# Patient Record
Sex: FEMALE | Race: BLACK OR AFRICAN AMERICAN | Employment: UNEMPLOYED | URBAN - METROPOLITAN AREA
[De-identification: names, ages, dates, MRNs, and addresses within clinical notes are randomized per-mention and may not be internally consistent; named-entity substitution may affect disease eponyms.]

---

## 2024-02-06 PROBLEM — N18.6 END-STAGE RENAL DISEASE (HCC): Chronic | Status: ACTIVE | Noted: 2021-07-23

## 2024-02-06 PROBLEM — E87.5 HYPERKALEMIA: Status: ACTIVE | Noted: 2018-05-29

## 2024-02-06 PROBLEM — E66.01 MORBID OBESITY (HCC): Status: ACTIVE | Noted: 2022-04-28

## 2024-02-06 PROBLEM — M10.9 GOUT: Status: ACTIVE | Noted: 2022-04-29

## 2024-02-06 PROBLEM — M10.9 GOUTY ARTHRITIS: Status: ACTIVE | Noted: 2018-06-13

## 2024-02-06 PROBLEM — D50.9 IRON DEFICIENCY ANEMIA: Status: ACTIVE | Noted: 2021-06-14

## 2024-02-06 PROBLEM — E55.9 VITAMIN D DEFICIENCY: Status: ACTIVE | Noted: 2019-03-30

## 2024-02-06 PROBLEM — E78.2 MIXED HYPERLIPIDEMIA: Status: ACTIVE | Noted: 2019-10-19

## 2024-02-26 RX ORDER — NICOTINE POLACRILEX 4 MG
30 LOZENGE BUCCAL
Status: CANCELLED | OUTPATIENT
Start: 2024-02-26

## 2024-02-26 RX ORDER — SODIUM CHLORIDE, SODIUM LACTATE, POTASSIUM CHLORIDE, CALCIUM CHLORIDE 600; 310; 30; 20 MG/100ML; MG/100ML; MG/100ML; MG/100ML
INJECTION, SOLUTION INTRAVENOUS CONTINUOUS
Status: CANCELLED | OUTPATIENT
Start: 2024-02-26

## 2024-02-26 RX ORDER — DEXTROSE MONOHYDRATE 25 G/50ML
50 INJECTION, SOLUTION INTRAVENOUS
Status: CANCELLED | OUTPATIENT
Start: 2024-02-26

## 2024-02-26 RX ORDER — NICOTINE POLACRILEX 4 MG
15 LOZENGE BUCCAL
Status: CANCELLED | OUTPATIENT
Start: 2024-02-26

## 2024-04-18 ENCOUNTER — ANESTHESIA (OUTPATIENT)
Dept: ENDOSCOPY | Facility: HOSPITAL | Age: 64
End: 2024-04-18
Payer: MEDICARE

## 2024-04-18 ENCOUNTER — ANESTHESIA EVENT (OUTPATIENT)
Dept: ENDOSCOPY | Facility: HOSPITAL | Age: 64
End: 2024-04-18
Payer: MEDICARE

## 2024-04-18 ENCOUNTER — HOSPITAL ENCOUNTER (OUTPATIENT)
Facility: HOSPITAL | Age: 64
Setting detail: HOSPITAL OUTPATIENT SURGERY
Discharge: HOME | End: 2024-04-18
Attending: INTERNAL MEDICINE | Admitting: INTERNAL MEDICINE
Payer: MEDICARE

## 2024-04-18 VITALS
HEIGHT: 64.5 IN | DIASTOLIC BLOOD PRESSURE: 73 MMHG | HEART RATE: 71 BPM | SYSTOLIC BLOOD PRESSURE: 146 MMHG | BODY MASS INDEX: 39.12 KG/M2 | TEMPERATURE: 98 F | RESPIRATION RATE: 16 BRPM | WEIGHT: 232 LBS | OXYGEN SATURATION: 98 %

## 2024-04-18 DIAGNOSIS — Z12.11 SCREENING FOR COLON CANCER: ICD-10-CM

## 2024-04-18 DIAGNOSIS — D50.8 OTHER IRON DEFICIENCY ANEMIAS: ICD-10-CM

## 2024-04-18 LAB
GLUCOSE BLD-MCNC: 132 MG/DL (ref 70–99)
GLUCOSE BLD-MCNC: 52 MG/DL (ref 70–99)

## 2024-04-18 PROCEDURE — 82962 GLUCOSE BLOOD TEST: CPT

## 2024-04-18 PROCEDURE — 88305 TISSUE EXAM BY PATHOLOGIST: CPT | Performed by: INTERNAL MEDICINE

## 2024-04-18 PROCEDURE — 0DB78ZX EXCISION OF STOMACH, PYLORUS, VIA NATURAL OR ARTIFICIAL OPENING ENDOSCOPIC, DIAGNOSTIC: ICD-10-PCS | Performed by: INTERNAL MEDICINE

## 2024-04-18 PROCEDURE — 0DBK8ZX EXCISION OF ASCENDING COLON, VIA NATURAL OR ARTIFICIAL OPENING ENDOSCOPIC, DIAGNOSTIC: ICD-10-PCS | Performed by: INTERNAL MEDICINE

## 2024-04-18 PROCEDURE — 0DBM8ZX EXCISION OF DESCENDING COLON, VIA NATURAL OR ARTIFICIAL OPENING ENDOSCOPIC, DIAGNOSTIC: ICD-10-PCS | Performed by: INTERNAL MEDICINE

## 2024-04-18 PROCEDURE — 0DBL8ZX EXCISION OF TRANSVERSE COLON, VIA NATURAL OR ARTIFICIAL OPENING ENDOSCOPIC, DIAGNOSTIC: ICD-10-PCS | Performed by: INTERNAL MEDICINE

## 2024-04-18 RX ORDER — SODIUM CHLORIDE, SODIUM LACTATE, POTASSIUM CHLORIDE, CALCIUM CHLORIDE 600; 310; 30; 20 MG/100ML; MG/100ML; MG/100ML; MG/100ML
INJECTION, SOLUTION INTRAVENOUS CONTINUOUS PRN
Status: DISCONTINUED | OUTPATIENT
Start: 2024-04-18 | End: 2024-04-18 | Stop reason: SURG

## 2024-04-18 RX ORDER — NALOXONE HYDROCHLORIDE 0.4 MG/ML
0.08 INJECTION, SOLUTION INTRAMUSCULAR; INTRAVENOUS; SUBCUTANEOUS ONCE AS NEEDED
Status: DISCONTINUED | OUTPATIENT
Start: 2024-04-18 | End: 2024-04-18

## 2024-04-18 RX ORDER — DEXTROSE MONOHYDRATE 25 G/50ML
50 INJECTION, SOLUTION INTRAVENOUS
Status: DISCONTINUED | OUTPATIENT
Start: 2024-04-18 | End: 2024-04-18

## 2024-04-18 RX ORDER — LIDOCAINE HYDROCHLORIDE 10 MG/ML
INJECTION, SOLUTION EPIDURAL; INFILTRATION; INTRACAUDAL; PERINEURAL AS NEEDED
Status: DISCONTINUED | OUTPATIENT
Start: 2024-04-18 | End: 2024-04-18 | Stop reason: SURG

## 2024-04-18 RX ORDER — NICOTINE POLACRILEX 4 MG
15 LOZENGE BUCCAL
Status: DISCONTINUED | OUTPATIENT
Start: 2024-04-18 | End: 2024-04-18

## 2024-04-18 RX ORDER — SODIUM CHLORIDE, SODIUM LACTATE, POTASSIUM CHLORIDE, CALCIUM CHLORIDE 600; 310; 30; 20 MG/100ML; MG/100ML; MG/100ML; MG/100ML
INJECTION, SOLUTION INTRAVENOUS CONTINUOUS
Status: DISCONTINUED | OUTPATIENT
Start: 2024-04-18 | End: 2024-04-18

## 2024-04-18 RX ORDER — DEXTROSE MONOHYDRATE 25 G/50ML
INJECTION, SOLUTION INTRAVENOUS
Status: COMPLETED
Start: 2024-04-18 | End: 2024-04-18

## 2024-04-18 RX ORDER — SODIUM CHLORIDE 9 MG/ML
INJECTION, SOLUTION INTRAVENOUS CONTINUOUS
Status: DISCONTINUED | OUTPATIENT
Start: 2024-04-18 | End: 2024-04-18

## 2024-04-18 RX ORDER — NICOTINE POLACRILEX 4 MG
30 LOZENGE BUCCAL
Status: DISCONTINUED | OUTPATIENT
Start: 2024-04-18 | End: 2024-04-18

## 2024-04-18 RX ORDER — ONDANSETRON 2 MG/ML
4 INJECTION INTRAMUSCULAR; INTRAVENOUS ONCE AS NEEDED
Status: DISCONTINUED | OUTPATIENT
Start: 2024-04-18 | End: 2024-04-18

## 2024-04-18 RX ADMIN — SODIUM CHLORIDE, SODIUM LACTATE, POTASSIUM CHLORIDE, CALCIUM CHLORIDE: 600; 310; 30; 20 INJECTION, SOLUTION INTRAVENOUS at 14:27:00

## 2024-04-18 RX ADMIN — LIDOCAINE HYDROCHLORIDE 25 MG: 10 INJECTION, SOLUTION EPIDURAL; INFILTRATION; INTRACAUDAL; PERINEURAL at 14:27:00

## 2024-04-18 NOTE — OPERATIVE REPORT
Ladonna Mckeon Patient Status:  Hospital Outpatient Surgery    1960 MRN [id number]   Location [facility] ENDOSCOPY PAIN CENTER Attending Akil Nolasco DO   Hosp Day # 0 PCP MARILOU MCGRATH       PREOPERATIVE DIAGNOSIS/INDICATION: Anemia  POSTOPERTATIVE DIAGNOSIS: See Impression  PROCEDURE PERFORMED:  EGD WITH BIOPSY  DATE: 24  SEDATION: MAC sedation provided by General Anesthesia    TIME OUT WAS PERFORMED    INFORMED CONSENT: I have discussed the risks, benefits, and alternatives to upper GI endoscopy with the patient including but not limited to the risks of bleeding, infection, pain, as well as the risks of anesthesia and perforation all possibly leading to prolonged hospitalization, surgical intervention. All questions were answered to the patient’s satisfaction. The patient elected to proceed with upper GI endoscopy with intervention as indicated.    PROCEDURE DESCRIPTION: A regular upper endoscope was introduced into the patient’s mouth, hypo pharynx, esophagus, stomach and the first and second portion of the duodenum. Retroflexion of the endoscope was performed in the stomach.  Careful examination of the above described areas was performed on withdrawal of the endoscope. The patient tolerated the procedure well.  There were no immediate complications immediately following the procedure and the patient was transferred to recovery in stable condition.  FINDINGS:  Esophagus: The esophagus was normal. The esophagogastric junction was 40 cm from the incisors. The squamocolumnar junction was 40 cm from the incisors and regular.     Stomach: Two gastric antral ulcers 10 mm and 3 mm with gastritis. Random gastric biopsies were taken to rule out H. Pylori.     Duodenum: The duodenal bulb was normal. The examined descending duodenum was normal.      IMPRESSION:   1. Gastric Ulcers.   RECOMMENDATIONS:    1. Follow up pathology results.    2. Pantoprazole 40 mg twice daily x 8 weeks.    3. Repeat EGD  in 8 weeks to assess for healing.     ALFRED Nolasco  Gastroenterology/Advanced Endoscopy  [facility]

## 2024-04-18 NOTE — ANESTHESIA PREPROCEDURE EVALUATION
PRE-OP EVALUATION    Patient Name: Ladonna Mckeon    Admit Diagnosis: Screening for colon cancer [Z12.11]  Other iron deficiency anemias [D50.8]    Pre-op Diagnosis: Screening for colon cancer [Z12.11]  Other iron deficiency anemias [D50.8]    ESOPHAGOGASTRODUODENOSCOPY (EGD), COLONOSCOPY    Anesthesia Procedure: ESOPHAGOGASTRODUODENOSCOPY (EGD), COLONOSCOPY  COLONOSCOPY    Surgeons and Role:     * Amankia, Akil, DO - Primary    Pre-op vitals reviewed.        Body mass index is 39.21 kg/m².    Current medications reviewed.  Hospital Medications:   glucose (Dex4) 15 GM/59ML oral liquid 15 g  15 g Oral Q15 Min PRN    Or    glucose (Glutose) 40% oral gel 15 g  15 g Oral Q15 Min PRN    Or    glucose-vitamin C (Dex-4) chewable tab 4 tablet  4 tablet Oral Q15 Min PRN    Or    dextrose 50% injection 50 mL  50 mL Intravenous Q15 Min PRN    Or    glucose (Dex4) 15 GM/59ML oral liquid 30 g  30 g Oral Q15 Min PRN    Or    glucose (Glutose) 40% oral gel 30 g  30 g Oral Q15 Min PRN    Or    glucose-vitamin C (Dex-4) chewable tab 8 tablet  8 tablet Oral Q15 Min PRN    sodium chloride 0.9% infusion   Intravenous Continuous       Outpatient Medications:     Medications Prior to Admission   Medication Sig Dispense Refill Last Dose    amLODIPine 10 MG Oral Tab Take 1 tablet (10 mg total) by mouth daily.       aspirin 81 MG Oral Tab EC Take 1 tablet (81 mg total) by mouth daily.       atorvastatin 40 MG Oral Tab Take 1 tablet (40 mg total) by mouth daily.       Ferric Citrate (AURYXIA) 1  MG(Fe) Oral Tab Take 1 tablet by mouth 3 (three) times daily.       furosemide 40 MG Oral Tab Take 1 tablet (40 mg total) by mouth daily.       glipiZIDE ER 10 MG Oral Tablet 24 Hr Take 1 tablet (10 mg total) by mouth 2 (two) times daily.       insulin glargine (LANTUS SOLOSTAR) 100 UNIT/ML Subcutaneous Solution Pen-injector Inject into the skin every evening. Sliding scale       LORazepam 0.5 MG Oral Tab Take 1 tablet (0.5 mg total) by mouth.        metoprolol succinate ER 25 MG Oral Tablet 24 Hr Take 1 tablet (25 mg total) by mouth daily.       montelukast 10 MG Oral Tab Take 1 tablet (10 mg total) by mouth once as needed.       Multiple Vitamins-Minerals (MULTI-VITAMIN/MINERALS) Oral Tab Take 1 tablet by mouth 3 (three) times a week.       polyethylene glycol, PEG 3350-KCl-NaBcb-NaCl-NaSulf, 236 g Oral Recon Soln Take as directed by physician (Patient not taking: Reported on 2/26/2024) 4000 mL 0 Not Taking       Allergies: Patient has no known allergies.      Anesthesia Evaluation        Anesthetic Complications           GI/Hepatic/Renal             (+) chronic renal disease and ESRD and hemodialysis                   Cardiovascular                  (+) hypertension                                     Endo/Other      (+) diabetes                            Pulmonary                           Neuro/Psych                                      Past Surgical History:   Procedure Laterality Date    Av fistula revision, open      Leep  2018    Tubal ligation       Social History     Socioeconomic History    Marital status: Legally    Tobacco Use    Smoking status: Never    Smokeless tobacco: Never   Vaping Use    Vaping status: Never Used   Substance and Sexual Activity    Alcohol use: Never    Drug use: Never     History   Drug Use Unknown     Available pre-op labs reviewed.               Airway      Mallampati: II  Mouth opening: 3 FB  TM distance: 4 - 6 cm  Neck ROM: full Cardiovascular    Cardiovascular exam normal.         Dental    Dentition appears grossly intact         Pulmonary    Pulmonary exam normal.                 Other findings              ASA: 3   Plan: MAC  NPO status verified and patient meets guidelines.    Post-procedure pain management plan discussed with surgeon and patient.      Plan/risks discussed with: patient and child/children                Present on Admission:  **None**

## 2024-04-18 NOTE — ANESTHESIA POSTPROCEDURE EVALUATION
[facility]    Ladonna Mckeon Patient Status:  Hospital Outpatient Surgery   Age/Gender 63 year old female MRN [id number]   Location [facility] ENDOSCOPY PAIN CENTER Attending Akil Nolasco DO   Hosp Day # 0 PCP MARILOU MCGRATH       Anesthesia Post-op Note    ESOPHAGOGASTRODUODENOSCOPY (EGD) WITH BIOPSIES, COLONOSCOPY WITH FORCEP POLYPECTOMY AND COLD SNARE POLYPECTOMY    Procedure Summary       Date: 04/18/24 Room / Location:  ENDOSCOPY 04 / [facility]    Anesthesia Start: 1425 Anesthesia Stop: 1455    Procedures:       ESOPHAGOGASTRODUODENOSCOPY (EGD) WITH BIOPSIES, COLONOSCOPY WITH FORCEP POLYPECTOMY AND COLD SNARE POLYPECTOMY      COLONOSCOPY Diagnosis:       Screening for colon cancer      Other iron deficiency anemias      (EGD-GASTRIC ULCERS  COLONOSCOPY- POLYPS AND DIVERTICULOSIS)    Surgeons: Akil Nolasco DO Anesthesiologist: Boston Gonzalez MD    Anesthesia Type: MAC ASA Status: 3            Anesthesia Type: MAC    Vitals Value Taken Time   /73 04/18/24 1459   Temp 97.8 04/18/24 1502   Pulse 72 04/18/24 1501   Resp 16 04/18/24 1455   SpO2 98 % 04/18/24 1501   Vitals shown include unfiled device data.    Patient Location: Endoscopy    Anesthesia Type: MAC    Airway Patency: patent    Postop Pain Control: adequate    Mental Status: preanesthetic baseline    Nausea/Vomiting: none    Cardiopulmonary/Hydration status: stable euvolemic    Complications: no apparent anesthesia related complications    Postop vital signs: stable    Dental Exam: Unchanged from Preop    Patient to be discharged home when criteria met.

## 2024-04-18 NOTE — H&P
History & Physical Examination    Patient Name: Ladonna Mckeon  MRN: [id number]  [facility]: [id number]  YOB: 1960    Diagnosis: anemia, colorectal cancer screen    Present Illness: 64 y/o F history as above presents for EGD/Colonoscopy.     Medications Prior to Admission   Medication Sig Dispense Refill Last Dose    amLODIPine 10 MG Oral Tab Take 1 tablet (10 mg total) by mouth daily.       aspirin 81 MG Oral Tab EC Take 1 tablet (81 mg total) by mouth daily.       atorvastatin 40 MG Oral Tab Take 1 tablet (40 mg total) by mouth daily.       Ferric Citrate (AURYXIA) 1  MG(Fe) Oral Tab Take 1 tablet by mouth 3 (three) times daily.       furosemide 40 MG Oral Tab Take 1 tablet (40 mg total) by mouth daily.       glipiZIDE ER 10 MG Oral Tablet 24 Hr Take 1 tablet (10 mg total) by mouth 2 (two) times daily.       insulin glargine (LANTUS SOLOSTAR) 100 UNIT/ML Subcutaneous Solution Pen-injector Inject into the skin every evening. Sliding scale       LORazepam 0.5 MG Oral Tab Take 1 tablet (0.5 mg total) by mouth.       metoprolol succinate ER 25 MG Oral Tablet 24 Hr Take 1 tablet (25 mg total) by mouth daily.       montelukast 10 MG Oral Tab Take 1 tablet (10 mg total) by mouth once as needed.       Multiple Vitamins-Minerals (MULTI-VITAMIN/MINERALS) Oral Tab Take 1 tablet by mouth 3 (three) times a week.       polyethylene glycol, PEG 3350-KCl-NaBcb-NaCl-NaSulf, 236 g Oral Recon Soln Take as directed by physician (Patient not taking: Reported on 2/26/2024) 4000 mL 0 Not Taking     Current Facility-Administered Medications   Medication Dose Route Frequency    glucose (Dex4) 15 GM/59ML oral liquid 15 g  15 g Oral Q15 Min PRN    Or    glucose (Glutose) 40% oral gel 15 g  15 g Oral Q15 Min PRN    Or    glucose-vitamin C (Dex-4) chewable tab 4 tablet  4 tablet Oral Q15 Min PRN    Or    dextrose 50% injection 50 mL  50 mL Intravenous Q15 Min PRN    Or    glucose (Dex4) 15 GM/59ML oral liquid 30 g  30 g Oral Q15 Min  PRN    Or    glucose (Glutose) 40% oral gel 30 g  30 g Oral Q15 Min PRN    Or    glucose-vitamin C (Dex-4) chewable tab 8 tablet  8 tablet Oral Q15 Min PRN    sodium chloride 0.9% infusion   Intravenous Continuous    dextrose 50% injection           Allergies: No Known Allergies    Past Medical History:    Black stools    Constipation    Diabetes (HCC)    Dialysis patient (HCC)    HD m/w/f    Fatigue    Feeling lonely    Frequent urination    Frequent use of laxatives    Gout    Hemodialysis status ([facility])    High blood pressure    History of depression    Hoarseness, chronic    Irregular bowel habits    Kidney failure    Loss of appetite    Nausea    Night sweats    Renal disorder    Uncomfortable fullness after meals    Visual impairment    glasses    Vomiting    Wears glasses     Past Surgical History:   Procedure Laterality Date    Av fistula revision, open      Leep  2018    Tubal ligation       Family History   Problem Relation Age of Onset    Diabetes Father     Prostate Cancer Father     Diabetes Mother     Colon Cancer Paternal Aunt      Social History     Tobacco Use    Smoking status: Never    Smokeless tobacco: Never   Substance Use Topics    Alcohol use: Never       SYSTEM Check if Review is Normal Check if Physical Exam is Normal If not normal, please explain:   HEENT [ x] [x ]    NECK & BACK [ x] [ x]    HEART [ x] [x ]    LUNGS [ x] [ x]    ABDOMEN [ x] [x ]    UROGENITAL [ n/a] [ n/a]    EXTREMITIES [ x] [x ]    OTHER        [ x ] I have discussed the risks and benefits and alternatives with the patient/family.  They understand and agree to proceed with plan of care.  [ x ] I have reviewed the History and Physical done within the last 30 days.  Any changes noted above.    Akil Nolasco DO  4/18/2024  1:43 PM

## 2024-04-18 NOTE — DISCHARGE INSTRUCTIONS
Home Care Instructions for Colonoscopy and/or Gastroscopy With Sedation    Diet:  - Resume your regular diet as tolerated unless otherwise instructed.  - start with light meals to minimize bloating.  - Do not drink alcohol today.    Medication:  - If you have questions about resuming your normal medications, please contact your Primary Care Physician.    Activities:  - Take it easy today. Do not return to work today.  - Do not drive today.  - Do not operate any machinery today (including kitchen equipment).    Colonoscopy:  - You may notice some rectal \"spotting\" (a little blood on the toilet tissue) for a day or two after the exam. This is normal.  - If you experience any rectal bleeding (not spotting), persistent tenderness or sharp severe abdominal pains, oral temperature over 100 degrees Farenheit, light-headedness or dizziness, or any other problems, contact your doctor.    Gastroscopy:  - You may have a sore throat for 2-3 days following the exam. This is normal. Gargling with warm salt water (1/2 tsp salt to 1 glass warm water) or using throat lozenges will help.  - If you experience any sharp pain in your neck, abdomen or chest, vomiting of blood, oral temperature over 100 degrees Farenheit, light-headedness or dizziness, or any other problems, contact your doctor.    **If unable to reach your doctor, please go to the [facility] Emergency Room**    - Your referring physician will receive a full report of your examination.  - If you do not hear from your doctor's office within two weeks of your biopsy, please call them for your results.    Additional Comments/Instructions (if applicable):

## 2024-04-18 NOTE — OPERATIVE REPORT
Ladonna Mckeon Patient Status:  Hospital Outpatient Surgery    1960 MRN [id number]   [facility] ENDOSCOPY PAIN CENTER Attending Akil Nolasco DO   Hosp Day # 0 PCP MARILOU MCGRATH       PREOPERATIVE DIAGNOSIS/INDICATION: Colorectal Cancer Screen  POSTOPERTATIVE DIAGNOSIS: See Impression  PROCEDURE PERFORMED: COLONOSCOPY with SNARE  DATE: 24  SEDATION: MAC sedation provided by General Anesthesia    TIME OUT WAS PERFORMED    INFORMED CONSENT: I have discussed the risks, benefits, and alternatives to colonoscopy with the patient including but not limited to the risks of bleeding, infection, pain, as well as the risks of anesthesia and perforation all possibly leading to prolonged hospitalization, surgical intervention. I explained that 5-10 % of polyps may be missed even in the best of all circumstances. All questions were answered to the patient’s satisfaction. The patient elected to proceed with colonoscopy with intervention as indicated.  PROCEDURE DESCRIPTION: After careful digital rectal examination a  colonoscope was introduced into the patients rectum, advanced beyond the recto sigmoid junction, into the descending colon, splenic flexure, transverse colon, hepatic flexure, ascending colon, cecum, confirmed by landmarks, including the appendiceal orifice and ileocecal valve. Careful examination of the above described areas was performed on withdrawal of the endoscope. Retroflexion was performed in the rectum. The patient tolerated the procedure well.  There were no immediate complications immediately following the procedure.  The patient was transferred to recovery in stable condition.  QUALITY OF PREPARATION: Mallory Bowel Preparation Scale:    Right colon 2, Transverse colon 2, Left colon 2   FINDINGS:    Cecum: The mucosa and vascular pattern were normal.   Ascending colon: 2 mm sessile polyp s/p removal using cold biopsy forceps.   Transverse colon: 3 mm sessile polyp s/p cold  snare polypectomy.  Diverticulosis.   Descending colon: 3 mm sessile polyp s/p cold snare polypectomy. Diverticulosis.  Sigmoid colon: The mucosa and vascular pattern were normal. Diverticulosis.   Rectum: The mucosa and vascular pattern were normal. Retroflexion revealed small internal hemorrhoids.     IMPRESSION:   1. Colon Polyps.    2. Diverticulosis.    3. Internal Hemorrhoids.     RECOMMENDATIONS:   1. Await pathology results.    2. Repeat colonoscopy in 3 years, pending pathology.       ALFRED [facility]  Gastroenterology/Advanced Endoscopy  [facility]

## 2024-06-13 NOTE — PAT NURSING NOTE
Patient concerned about timing of procedure on Monday.June 24 because she is dialyzed Monday,Wednesday,Friday. I called ,office and left message for Lawanda, advising of patient concerns.I requested she call patient and discuss Patient  Contact number left on voicemail

## 2024-06-24 ENCOUNTER — ANESTHESIA (OUTPATIENT)
Dept: ENDOSCOPY | Facility: HOSPITAL | Age: 64
End: 2024-06-24

## 2024-06-24 ENCOUNTER — HOSPITAL ENCOUNTER (OUTPATIENT)
Facility: HOSPITAL | Age: 64
Setting detail: HOSPITAL OUTPATIENT SURGERY
Discharge: HOME | End: 2024-06-24
Attending: INTERNAL MEDICINE | Admitting: INTERNAL MEDICINE

## 2024-06-24 ENCOUNTER — ANESTHESIA EVENT (OUTPATIENT)
Dept: ENDOSCOPY | Facility: HOSPITAL | Age: 64
End: 2024-06-24

## 2024-06-24 VITALS
BODY MASS INDEX: 39.65 KG/M2 | TEMPERATURE: 97 F | DIASTOLIC BLOOD PRESSURE: 74 MMHG | HEART RATE: 67 BPM | WEIGHT: 238 LBS | HEIGHT: 65 IN | OXYGEN SATURATION: 99 % | SYSTOLIC BLOOD PRESSURE: 159 MMHG | RESPIRATION RATE: 18 BRPM

## 2024-06-24 DIAGNOSIS — K25.9 GASTRIC ULCER, UNSPECIFIED CHRONICITY, UNSPECIFIED WHETHER GASTRIC ULCER HEMORRHAGE OR PERFORATION PRESENT: ICD-10-CM

## 2024-06-24 LAB
GLUCOSE BLD-MCNC: 43 MG/DL (ref 70–99)
GLUCOSE BLD-MCNC: 59 MG/DL (ref 70–99)
GLUCOSE BLD-MCNC: 69 MG/DL (ref 70–99)
GLUCOSE BLD-MCNC: 71 MG/DL (ref 70–99)

## 2024-06-24 PROCEDURE — 82962 GLUCOSE BLOOD TEST: CPT

## 2024-06-24 PROCEDURE — 0DB68ZX EXCISION OF STOMACH, VIA NATURAL OR ARTIFICIAL OPENING ENDOSCOPIC, DIAGNOSTIC: ICD-10-PCS | Performed by: INTERNAL MEDICINE

## 2024-06-24 PROCEDURE — 88342 IMHCHEM/IMCYTCHM 1ST ANTB: CPT | Performed by: INTERNAL MEDICINE

## 2024-06-24 PROCEDURE — 88305 TISSUE EXAM BY PATHOLOGIST: CPT | Performed by: INTERNAL MEDICINE

## 2024-06-24 RX ORDER — DEXTROSE, SODIUM CHLORIDE, SODIUM LACTATE, POTASSIUM CHLORIDE, AND CALCIUM CHLORIDE 5; .6; .31; .03; .02 G/100ML; G/100ML; G/100ML; G/100ML; G/100ML
INJECTION, SOLUTION INTRAVENOUS CONTINUOUS
Status: DISCONTINUED | OUTPATIENT
Start: 2024-06-24 | End: 2024-06-24

## 2024-06-24 RX ORDER — NICOTINE POLACRILEX 4 MG
30 LOZENGE BUCCAL
Status: DISCONTINUED | OUTPATIENT
Start: 2024-06-24 | End: 2024-06-24

## 2024-06-24 RX ORDER — DEXTROSE MONOHYDRATE 25 G/50ML
25 INJECTION, SOLUTION INTRAVENOUS AS NEEDED
Status: DISCONTINUED | OUTPATIENT
Start: 2024-06-24 | End: 2024-06-24

## 2024-06-24 RX ORDER — DEXTROSE MONOHYDRATE 25 G/50ML
50 INJECTION, SOLUTION INTRAVENOUS
Status: DISCONTINUED | OUTPATIENT
Start: 2024-06-24 | End: 2024-06-24

## 2024-06-24 RX ORDER — SODIUM CHLORIDE, SODIUM LACTATE, POTASSIUM CHLORIDE, CALCIUM CHLORIDE 600; 310; 30; 20 MG/100ML; MG/100ML; MG/100ML; MG/100ML
INJECTION, SOLUTION INTRAVENOUS CONTINUOUS
Status: DISCONTINUED | OUTPATIENT
Start: 2024-06-24 | End: 2024-06-24

## 2024-06-24 RX ORDER — NICOTINE POLACRILEX 4 MG
15 LOZENGE BUCCAL
Status: DISCONTINUED | OUTPATIENT
Start: 2024-06-24 | End: 2024-06-24

## 2024-06-24 RX ORDER — LIDOCAINE HYDROCHLORIDE 10 MG/ML
INJECTION, SOLUTION EPIDURAL; INFILTRATION; INTRACAUDAL; PERINEURAL AS NEEDED
Status: DISCONTINUED | OUTPATIENT
Start: 2024-06-24 | End: 2024-06-24 | Stop reason: SURG

## 2024-06-24 RX ORDER — NALOXONE HYDROCHLORIDE 0.4 MG/ML
0.08 INJECTION, SOLUTION INTRAMUSCULAR; INTRAVENOUS; SUBCUTANEOUS ONCE AS NEEDED
Status: DISCONTINUED | OUTPATIENT
Start: 2024-06-24 | End: 2024-06-24

## 2024-06-24 RX ORDER — DEXTROSE MONOHYDRATE 25 G/50ML
INJECTION, SOLUTION INTRAVENOUS
Status: DISCONTINUED
Start: 2024-06-24 | End: 2024-06-24

## 2024-06-24 RX ORDER — SODIUM CHLORIDE 9 MG/ML
INJECTION, SOLUTION INTRAVENOUS CONTINUOUS
Status: DISCONTINUED | OUTPATIENT
Start: 2024-06-24 | End: 2024-06-24

## 2024-06-24 RX ADMIN — LIDOCAINE HYDROCHLORIDE 100 MG: 10 INJECTION, SOLUTION EPIDURAL; INFILTRATION; INTRACAUDAL; PERINEURAL at 13:03:00

## 2024-06-24 NOTE — H&P
History & Physical Examination    Patient Name: Ladonna Mckeon  MRN: [id number]  CSN: [id number]  YOB: 1960    Diagnosis: history of gastric ulcers    Present Illness: 65 y/o F history as above presents for EGD.     Medications Prior to Admission   Medication Sig Dispense Refill Last Dose    pantoprazole 40 MG Oral Tab EC Take 1 tablet (40 mg total) by mouth 2 (two) times daily before meals. Take one tablet (40 mg total) by mouth once daily, 30 minutes prior to breakfast. (Patient taking differently: Take 1 tablet (40 mg total) by mouth as needed. Take one tablet (40 mg total) by mouth once daily, 30 minutes prior to breakfast.) 90 tablet 1 Past Week    amLODIPine 10 MG Oral Tab Take 1 tablet (10 mg total) by mouth daily.   6/23/2024    aspirin 81 MG Oral Tab EC Take 1 tablet (81 mg total) by mouth daily.   6/23/2024    atorvastatin 40 MG Oral Tab Take 1 tablet (40 mg total) by mouth daily.   6/23/2024    furosemide 40 MG Oral Tab Take 1 tablet (40 mg total) by mouth daily.   6/23/2024    glipiZIDE ER 10 MG Oral Tablet 24 Hr Take 1 tablet (10 mg total) by mouth daily.   6/23/2024    insulin glargine (LANTUS SOLOSTAR) 100 UNIT/ML Subcutaneous Solution Pen-injector Inject into the skin every evening. Sliding scale   6/23/2024    metoprolol succinate ER 25 MG Oral Tablet 24 Hr Take 1 tablet (25 mg total) by mouth daily.   6/23/2024    montelukast 10 MG Oral Tab Take 1 tablet (10 mg total) by mouth once as needed.   6/23/2024    Multiple Vitamins-Minerals (MULTI-VITAMIN/MINERALS) Oral Tab Take 1 tablet by mouth 3 (three) times a week.   6/23/2024    polyethylene glycol, PEG 3350-KCl-NaBcb-NaCl-NaSulf, 236 g Oral Recon Soln Take as directed by physician 4000 mL 0      Current Facility-Administered Medications   Medication Dose Route Frequency    glucose (Dex4) 15 GM/59ML oral liquid 15 g  15 g Oral Q15 Min PRN    Or    glucose (Glutose) 40% oral gel 15 g  15 g Oral Q15 Min PRN    Or    glucose-vitamin C (Dex-4)  chewable tab 4 tablet  4 tablet Oral Q15 Min PRN    Or    dextrose 50% injection 50 mL  50 mL Intravenous Q15 Min PRN    Or    glucose (Dex4) 15 GM/59ML oral liquid 30 g  30 g Oral Q15 Min PRN    Or    glucose (Glutose) 40% oral gel 30 g  30 g Oral Q15 Min PRN    Or    glucose-vitamin C (Dex-4) chewable tab 8 tablet  8 tablet Oral Q15 Min PRN    sodium chloride 0.9% infusion   Intravenous Continuous    dextrose 50% injection           Allergies: No Known Allergies    Past Medical History:    Black stools    Constipation    Diabetes (HCC)    Dialysis patient (HCC)    HD m/w/f    Fatigue    Feeling lonely    Frequent urination    Frequent use of laxatives    Gout    Hemodialysis status ([facility])    High blood pressure    High cholesterol    History of depression    Hoarseness, chronic    Irregular bowel habits    Kidney failure    Loss of appetite    Muscle weakness    Nausea    Night sweats    Renal disorder    Uncomfortable fullness after meals    Visual impairment    glasses    Vomiting    Wears glasses     Past Surgical History:   Procedure Laterality Date    Av fistula revision, open      Colonoscopy N/A 4/18/2024    Procedure: COLONOSCOPY;  Surgeon: Akil Nolasco DO;  Location:  ENDOSCOPY    Leep  2018    Tubal ligation       Family History   Problem Relation Age of Onset    Diabetes Father     Prostate Cancer Father     Diabetes Mother     Colon Cancer Paternal Aunt      Social History     Tobacco Use    Smoking status: Never    Smokeless tobacco: Never   Substance Use Topics    Alcohol use: Not Currently     Comment: rare at holidays       SYSTEM Check if Review is Normal Check if Physical Exam is Normal If not normal, please explain:   HEENT [ x] [x ]    NECK & BACK [ x] [ x]    HEART [ x] [x ]    LUNGS [ x] [ x]    ABDOMEN [ x] [x ]    UROGENITAL [ n/a] [ n/a]    EXTREMITIES [ x] [x ]    OTHER        [ x ] I have discussed the risks and benefits and alternatives with the patient/family.  They understand  and agree to proceed with plan of care.  [ x ] I have reviewed the History and Physical done within the last 30 days.  Any changes noted above.    Akil Nolasco DO  6/24/2024  12:53 PM

## 2024-06-24 NOTE — ANESTHESIA POSTPROCEDURE EVALUATION
[facility]    Ladonna Mckeon Patient Status:  Hospital Outpatient Surgery   Age/Gender 64 year old female MRN [id number]   Location [facility] ENDOSCOPY PAIN CENTER Attending Akil Nolasco DO   Hosp Day # 0 PCP MARILOU MCGRATH       Anesthesia Post-op Note    ESOPHAGOGASTRODUODENOSCOPY (EGD) with biopsies    Procedure Summary       Date: 06/24/24 Room / Location:  ENDOSCOPY 02 / [facility]OSCOPY    Anesthesia Start: 1301 Anesthesia Stop: 1323    Procedure: ESOPHAGOGASTRODUODENOSCOPY (EGD) with biopsies Diagnosis:       Gastric ulcer, unspecified chronicity, unspecified whether gastric ulcer hemorrhage or perforation present      (Gastric ulcer)    Surgeons: Akil Nolasco DO Responsible Provider: Naawf Ramirez MD    Anesthesia Type: MAC ASA Status: 3            Anesthesia Type: MAC    Vitals Value Taken Time   /77 06/24/24 1323   Temp  06/24/24 1323   Pulse 78 06/24/24 1323   Resp 16 06/24/24 1323   SpO2 96 06/24/24 1323       Patient Location: Endoscopy    Anesthesia Type: MAC    Airway Patency: patent    Postop Pain Control: adequate    Mental Status: preanesthetic baseline    Nausea/Vomiting: none    Cardiopulmonary/Hydration status: stable euvolemic    Complications: no apparent anesthesia related complications    Postop vital signs: stable    Dental Exam: Unchanged from Preop    Patient to be discharged home when criteria met.

## 2024-06-24 NOTE — ANESTHESIA PREPROCEDURE EVALUATION
PRE-OP EVALUATION    Patient Name: Ladonan Mckeon    Admit Diagnosis: Gastric ulcer, unspecified chronicity, unspecified whether gastric ulcer hemorrhage or perforation present [K25.9]    Pre-op Diagnosis: Gastric ulcer, unspecified chronicity, unspecified whether gastric ulcer hemorrhage or perforation present [K25.9]    ESOPHAGOGASTRODUODENOSCOPY (EGD)    Anesthesia Procedure: ESOPHAGOGASTRODUODENOSCOPY (EGD)    Surgeons and Role:     * AmankiaAkil, DO - Primary    Pre-op vitals reviewed.  Temp: 97.1 °F (36.2 °C)  Pulse: 85  Resp: 18  BP: 157/71  SpO2: 100 %  Body mass index is 39.61 kg/m².    Current medications reviewed.  Hospital Medications:   glucose (Dex4) 15 GM/59ML oral liquid 15 g  15 g Oral Q15 Min PRN    Or    glucose (Glutose) 40% oral gel 15 g  15 g Oral Q15 Min PRN    Or    glucose-vitamin C (Dex-4) chewable tab 4 tablet  4 tablet Oral Q15 Min PRN    Or    dextrose 50% injection 50 mL  50 mL Intravenous Q15 Min PRN    Or    glucose (Dex4) 15 GM/59ML oral liquid 30 g  30 g Oral Q15 Min PRN    Or    glucose (Glutose) 40% oral gel 30 g  30 g Oral Q15 Min PRN    Or    glucose-vitamin C (Dex-4) chewable tab 8 tablet  8 tablet Oral Q15 Min PRN    sodium chloride 0.9% infusion   Intravenous Continuous    dextrose 50% injection           Outpatient Medications:     Medications Prior to Admission   Medication Sig Dispense Refill Last Dose    pantoprazole 40 MG Oral Tab EC Take 1 tablet (40 mg total) by mouth 2 (two) times daily before meals. Take one tablet (40 mg total) by mouth once daily, 30 minutes prior to breakfast. (Patient taking differently: Take 1 tablet (40 mg total) by mouth as needed. Take one tablet (40 mg total) by mouth once daily, 30 minutes prior to breakfast.) 90 tablet 1 Past Week    amLODIPine 10 MG Oral Tab Take 1 tablet (10 mg total) by mouth daily.   6/23/2024    aspirin 81 MG Oral Tab EC Take 1 tablet (81 mg total) by mouth daily.   6/23/2024    atorvastatin 40 MG Oral Tab Take 1  tablet (40 mg total) by mouth daily.   6/23/2024    furosemide 40 MG Oral Tab Take 1 tablet (40 mg total) by mouth daily.   6/23/2024    glipiZIDE ER 10 MG Oral Tablet 24 Hr Take 1 tablet (10 mg total) by mouth daily.   6/23/2024    insulin glargine (LANTUS SOLOSTAR) 100 UNIT/ML Subcutaneous Solution Pen-injector Inject into the skin every evening. Sliding scale   6/23/2024    metoprolol succinate ER 25 MG Oral Tablet 24 Hr Take 1 tablet (25 mg total) by mouth daily.   6/23/2024    montelukast 10 MG Oral Tab Take 1 tablet (10 mg total) by mouth once as needed.   6/23/2024    Multiple Vitamins-Minerals (MULTI-VITAMIN/MINERALS) Oral Tab Take 1 tablet by mouth 3 (three) times a week.   6/23/2024    polyethylene glycol, PEG 3350-KCl-NaBcb-NaCl-NaSulf, 236 g Oral Recon Soln Take as directed by physician 4000 mL 0        Allergies: Patient has no known allergies.      Anesthesia Evaluation    Patient summary reviewed.    Anesthetic Complications           GI/Hepatic/Renal             (+) chronic renal disease and ESRD                   Cardiovascular                  (+) hypertension   (+) hyperlipidemia                                  Endo/Other      (+) diabetes         (+) anemia                   Pulmonary                           Neuro/Psych                              Gout      Low blood sugar , being treated         Past Surgical History:   Procedure Laterality Date    Av fistula revision, open      Colonoscopy N/A 4/18/2024    Procedure: COLONOSCOPY;  Surgeon: Akil Nolasco DO;  Location:  ENDOSCOPY    [place]  2018    Tubal ligation       Social History     Socioeconomic History    Marital status: Legally    Tobacco Use    Smoking status: Never    Smokeless tobacco: Never   Vaping Use    Vaping status: Never Used   Substance and Sexual Activity    Alcohol use: Not Currently     Comment: rare at holidays    Drug use: Never     History   Drug Use Unknown     Available pre-op labs reviewed.                Airway      Mallampati: II  Mouth opening: >3 FB  TM distance: > 6 cm  Neck ROM: full Cardiovascular    Cardiovascular exam normal.         Dental             Pulmonary    Pulmonary exam normal.                 Other findings              ASA: 3   Plan: MAC  NPO status verified and           Plan/risks discussed with: patient                Present on Admission:  **None**

## 2024-06-24 NOTE — DISCHARGE INSTRUCTIONS

## 2024-06-24 NOTE — OPERATIVE REPORT
Ladonna Mckeon Patient Status:  Hospital Outpatient Surgery    1960 MRN [id number]   Location [facility] Attending Akil Nolasco DO   Hosp Day # 0 PCP MARILOU MCGRATH       PREOPERATIVE DIAGNOSIS/INDICATION: Gastric Ulcers  POSTOPERTATIVE DIAGNOSIS: See Impression  PROCEDURE PERFORMED:  EGD WITH BIOPSY  DATE: 24  SEDATION: MAC sedation provided by General Anesthesia    TIME OUT WAS PERFORMED    INFORMED CONSENT: I have discussed the risks, benefits, and alternatives to upper GI endoscopy with the patient including but not limited to the risks of bleeding, infection, pain, as well as the risks of anesthesia and perforation all possibly leading to prolonged hospitalization, surgical intervention. All questions were answered to the patient’s satisfaction. The patient elected to proceed with upper GI endoscopy with intervention as indicated.    PROCEDURE DESCRIPTION: A regular upper endoscope was introduced into the patient’s mouth, hypo pharynx, esophagus, stomach and the first and second portion of the duodenum. Retroflexion of the endoscope was performed in the stomach.  Careful examination of the above described areas was performed on withdrawal of the endoscope. The patient tolerated the procedure well.  There were no immediate complications immediately following the procedure and the patient was transferred to recovery in stable condition.  FINDINGS:  Esophagus: The esophagus was normal. The esophagogastric junction was 40 cm from the incisors. The squamocolumnar junction was 40 cm from the incisors and regular.     Stomach: Few small antral ulcers with erosions. Random gastric biopsies were taken to rule out H. Pylori.     Duodenum: The duodenal bulb was normal. The examined descending duodenum was normal.      IMPRESSION:   1. Gastric Ulcers.  RECOMMENDATIONS:    1. Follow up pathology results.    2. Avoid NSAIDs.    3. PPI daily.     A.  [facility]  [facility]  [facility]

## (undated) DEVICE — BITEBLOCK ENDOSCP 60FR MAXI STRP

## (undated) DEVICE — GIJAW SINGLE-USE BIOPSY FORCEPS WITH NEEDLE: Brand: GIJAW

## (undated) DEVICE — KIT CUSTOM ENDOPROCEDURE STERIS

## (undated) DEVICE — 1200CC GUARDIAN II: Brand: GUARDIAN

## (undated) DEVICE — TRAP POLYP W/ 2 SPEC TY CLR MAGNIFYING WIND

## (undated) DEVICE — 3M™ RED DOT™ MONITORING ELECTRODE WITH FOAM TAPE AND STICKY GEL, 50/BAG, 20/CASE, 72/PLT 2570: Brand: RED DOT™

## (undated) DEVICE — LASSO POLYPECTOMY SNARE: Brand: LASSO

## (undated) DEVICE — 10FT COMBINED O2 DELIVERY/CO2 MONITORING. FILTER WITH MICROSTREAM TYPE LUER: Brand: DUAL ADULT NASAL CANNULA

## (undated) DEVICE — KIT VLV 5 PC AIR H2O SUCT BX ENDOGATOR CONN

## (undated) DEVICE — 3M™ RED DOT™ MONITORING ELECTRODE WITH FOAM TAPE AND STICKY GEL 2570-3, 3/BAG, 200/CASE, 54/PLT: Brand: RED DOT™

## (undated) NOTE — LETTER
Patient Name: Ladonna Mckeon  Surgery Date: 6/24/2024  Medical Record: [id number] CSN: [id number]      Surgeon(s):  Akil Nolasco,   Consent Procedure: ESOPHAGOGASTRODUODENOSCOPY (EGD)  Anesthesia Type: MAC    Patient states she can't have procedure on Monday because she has HD on Mondays. She is aware that she needs to contact your office to discuss scheduling.     ONUR RANDALL

## (undated) NOTE — LETTER
Patient Name: Ladonna Mckeon  Surgery Date: 3/18/2024  Medical Record: [id number] CSN: [id number]      Surgeon(s):  Akil Nolasco, DO  Consent Procedure: ESOPHAGOGASTRODUODENOSCOPY (EGD), COLONOSCOPY  Anesthesia Type: MAC    Pt is aware she will need a ride home, but has not yet made arrangements.     Thanks,    PAT staff